# Patient Record
Sex: FEMALE | Race: WHITE | Employment: PART TIME | URBAN - METROPOLITAN AREA
[De-identification: names, ages, dates, MRNs, and addresses within clinical notes are randomized per-mention and may not be internally consistent; named-entity substitution may affect disease eponyms.]

---

## 2019-06-15 ENCOUNTER — APPOINTMENT (OUTPATIENT)
Dept: CT IMAGING | Facility: CLINIC | Age: 69
End: 2019-06-15
Attending: EMERGENCY MEDICINE
Payer: COMMERCIAL

## 2019-06-15 ENCOUNTER — HOSPITAL ENCOUNTER (EMERGENCY)
Facility: CLINIC | Age: 69
Discharge: LEFT BEFORE DISCHARGE | End: 2019-06-15
Attending: EMERGENCY MEDICINE | Admitting: EMERGENCY MEDICINE
Payer: COMMERCIAL

## 2019-06-15 VITALS
SYSTOLIC BLOOD PRESSURE: 139 MMHG | DIASTOLIC BLOOD PRESSURE: 77 MMHG | OXYGEN SATURATION: 97 % | WEIGHT: 215.39 LBS | TEMPERATURE: 98 F | RESPIRATION RATE: 13 BRPM | HEART RATE: 90 BPM

## 2019-06-15 DIAGNOSIS — R07.9 CHEST PAIN, UNSPECIFIED TYPE: ICD-10-CM

## 2019-06-15 LAB
ALBUMIN SERPL-MCNC: 3.8 G/DL (ref 3.4–5)
ALP SERPL-CCNC: 88 U/L (ref 40–150)
ALT SERPL W P-5'-P-CCNC: 27 U/L (ref 0–50)
ANION GAP SERPL CALCULATED.3IONS-SCNC: 7 MMOL/L (ref 3–14)
AST SERPL W P-5'-P-CCNC: 17 U/L (ref 0–45)
BASOPHILS # BLD AUTO: 0 10E9/L (ref 0–0.2)
BASOPHILS NFR BLD AUTO: 0.5 %
BILIRUB SERPL-MCNC: 0.5 MG/DL (ref 0.2–1.3)
BUN SERPL-MCNC: 17 MG/DL (ref 7–30)
CALCIUM SERPL-MCNC: 8.9 MG/DL (ref 8.5–10.1)
CHLORIDE SERPL-SCNC: 106 MMOL/L (ref 94–109)
CO2 SERPL-SCNC: 29 MMOL/L (ref 20–32)
CREAT SERPL-MCNC: 1.05 MG/DL (ref 0.52–1.04)
D DIMER PPP FEU-MCNC: 0.6 UG/ML FEU (ref 0–0.5)
DIFFERENTIAL METHOD BLD: NORMAL
EOSINOPHIL # BLD AUTO: 0.2 10E9/L (ref 0–0.7)
EOSINOPHIL NFR BLD AUTO: 3 %
ERYTHROCYTE [DISTWIDTH] IN BLOOD BY AUTOMATED COUNT: 13.9 % (ref 10–15)
GFR SERPL CREATININE-BSD FRML MDRD: 54 ML/MIN/{1.73_M2}
GLUCOSE SERPL-MCNC: 100 MG/DL (ref 70–99)
HCT VFR BLD AUTO: 41.8 % (ref 35–47)
HGB BLD-MCNC: 13.3 G/DL (ref 11.7–15.7)
IMM GRANULOCYTES # BLD: 0 10E9/L (ref 0–0.4)
IMM GRANULOCYTES NFR BLD: 0.4 %
LIPASE SERPL-CCNC: 259 U/L (ref 73–393)
LYMPHOCYTES # BLD AUTO: 2.5 10E9/L (ref 0.8–5.3)
LYMPHOCYTES NFR BLD AUTO: 31.5 %
MCH RBC QN AUTO: 29.1 PG (ref 26.5–33)
MCHC RBC AUTO-ENTMCNC: 31.8 G/DL (ref 31.5–36.5)
MCV RBC AUTO: 92 FL (ref 78–100)
MONOCYTES # BLD AUTO: 0.7 10E9/L (ref 0–1.3)
MONOCYTES NFR BLD AUTO: 8.4 %
NEUTROPHILS # BLD AUTO: 4.4 10E9/L (ref 1.6–8.3)
NEUTROPHILS NFR BLD AUTO: 56.2 %
NRBC # BLD AUTO: 0 10*3/UL
NRBC BLD AUTO-RTO: 0 /100
PLATELET # BLD AUTO: 310 10E9/L (ref 150–450)
POTASSIUM SERPL-SCNC: 4 MMOL/L (ref 3.4–5.3)
PROT SERPL-MCNC: 7.4 G/DL (ref 6.8–8.8)
RBC # BLD AUTO: 4.57 10E12/L (ref 3.8–5.2)
SODIUM SERPL-SCNC: 142 MMOL/L (ref 133–144)
TROPONIN I SERPL-MCNC: <0.015 UG/L (ref 0–0.04)
TROPONIN I SERPL-MCNC: <0.015 UG/L (ref 0–0.04)
WBC # BLD AUTO: 7.9 10E9/L (ref 4–11)

## 2019-06-15 PROCEDURE — 25000125 ZZHC RX 250: Performed by: EMERGENCY MEDICINE

## 2019-06-15 PROCEDURE — 85025 COMPLETE CBC W/AUTO DIFF WBC: CPT | Performed by: EMERGENCY MEDICINE

## 2019-06-15 PROCEDURE — 85379 FIBRIN DEGRADATION QUANT: CPT | Performed by: EMERGENCY MEDICINE

## 2019-06-15 PROCEDURE — 71260 CT THORAX DX C+: CPT

## 2019-06-15 PROCEDURE — 93005 ELECTROCARDIOGRAM TRACING: CPT

## 2019-06-15 PROCEDURE — 74177 CT ABD & PELVIS W/CONTRAST: CPT

## 2019-06-15 PROCEDURE — 84484 ASSAY OF TROPONIN QUANT: CPT | Mod: 91 | Performed by: EMERGENCY MEDICINE

## 2019-06-15 PROCEDURE — 80053 COMPREHEN METABOLIC PANEL: CPT | Performed by: EMERGENCY MEDICINE

## 2019-06-15 PROCEDURE — 25000132 ZZH RX MED GY IP 250 OP 250 PS 637: Mod: GY | Performed by: EMERGENCY MEDICINE

## 2019-06-15 PROCEDURE — 99285 EMERGENCY DEPT VISIT HI MDM: CPT | Mod: 25

## 2019-06-15 PROCEDURE — 25000128 H RX IP 250 OP 636: Performed by: EMERGENCY MEDICINE

## 2019-06-15 PROCEDURE — 83690 ASSAY OF LIPASE: CPT | Performed by: EMERGENCY MEDICINE

## 2019-06-15 RX ORDER — MORPHINE SULFATE 4 MG/ML
4 INJECTION, SOLUTION INTRAMUSCULAR; INTRAVENOUS ONCE
Status: DISCONTINUED | OUTPATIENT
Start: 2019-06-15 | End: 2019-06-15 | Stop reason: HOSPADM

## 2019-06-15 RX ORDER — IOPAMIDOL 755 MG/ML
500 INJECTION, SOLUTION INTRAVASCULAR ONCE
Status: COMPLETED | OUTPATIENT
Start: 2019-06-15 | End: 2019-06-15

## 2019-06-15 RX ORDER — ONDANSETRON 2 MG/ML
4 INJECTION INTRAMUSCULAR; INTRAVENOUS ONCE
Status: DISCONTINUED | OUTPATIENT
Start: 2019-06-15 | End: 2019-06-15 | Stop reason: HOSPADM

## 2019-06-15 RX ORDER — NITROGLYCERIN 0.4 MG/1
0.4 TABLET SUBLINGUAL EVERY 5 MIN PRN
Status: COMPLETED | OUTPATIENT
Start: 2019-06-15 | End: 2019-06-15

## 2019-06-15 RX ADMIN — NITROGLYCERIN 0.4 MG: 0.4 TABLET SUBLINGUAL at 12:31

## 2019-06-15 RX ADMIN — NITROGLYCERIN 0.4 MG: 0.4 TABLET SUBLINGUAL at 12:23

## 2019-06-15 RX ADMIN — SODIUM CHLORIDE 89 ML: 9 INJECTION, SOLUTION INTRAVENOUS at 14:23

## 2019-06-15 RX ADMIN — IOPAMIDOL 100 ML: 755 INJECTION, SOLUTION INTRAVENOUS at 14:22

## 2019-06-15 RX ADMIN — NITROGLYCERIN 0.4 MG: 0.4 TABLET SUBLINGUAL at 12:16

## 2019-06-15 RX ADMIN — LIDOCAINE HYDROCHLORIDE 30 ML: 20 SOLUTION ORAL; TOPICAL at 12:54

## 2019-06-15 ASSESSMENT — ENCOUNTER SYMPTOMS
ABDOMINAL PAIN: 1
NAUSEA: 1
SHORTNESS OF BREATH: 1

## 2019-06-15 NOTE — ED AVS SNAPSHOT
[facility] Emergency Department  [address]  [facility] [address]  Phone:  [phone]  Fax:  [phone]                                    Venita Balderrama   MRN: [id number]    Department:  [facility] Emergency Department   Date of Visit:  6/15/2019           After Visit Summary Signature Page    I have received my discharge instructions, and my questions have been answered. I have discussed any challenges I see with this plan with the nurse or doctor.    ..........................................................................................................................................  Patient/Patient Representative Signature      ..........................................................................................................................................  Patient Representative Print Name and Relationship to Patient    ..................................................               ................................................  Date                                   Time    ..........................................................................................................................................  Reviewed by Signature/Title    ...................................................              ..............................................  Date                                               Time          22EPIC Rev 08/18

## 2019-06-15 NOTE — ED PROVIDER NOTES
History     Chief Complaint:  No chief complaint on file.      HPI   Venita Balderrama is a 69 year old female who presents to the ED for evaluation of chest pain. She says that her pain started around 15-20 minutes ago, and she says that pain began at a 10/10 and now she reports it as a 7/10. Along with her chest pain, she says that she has shortness of breath that she describes as if someone is squeezing her on her mid section. She also notes that her abdomen hurts when there is pressure applied, and it is sore when she takes a deep breath. The patient also explains that she has not been feeling well for weeks and has been feeling nauseous. She denies ever having heart issues. Of note, she has been getting an upset stomach for about a week, and she says she used janice seltzer for relief. She lst had a dose of janice seltzer 45 minutes ago.    CARDIAC RISK FACTORS:  Sex:    Female  Tobacco:   No  Hypertension:   No  Hyperlipidemia:  No  Diabetes:   No  Family History:  NO    PE/DVT RISK FACTORS:  Sex:    Female  Hormones:   No  Tobacco:   No  Cancer:   No  Travel:   No  Surgery:   No  Other immobilization:  No  Personal history:  No  Family history:  No    Allergies:  Sudafed      Medications:    The patient is not currently taking any prescribed medications.       Past Medical History:    History reviewed.  No pertinent past medical history.    Past Surgical History:    History reviewed. No pertinent surgical history.    Family History:    History reviewed. No pertinent family history.     Social History:  Arrived to the ED with spouse        Review of Systems   Respiratory: Positive for shortness of breath.    Cardiovascular: Positive for chest pain.   Gastrointestinal: Positive for abdominal pain and nausea.   All other systems reviewed and are negative.        Physical Exam   First Vitals:  Patient Vitals for the past 24 hrs:   BP Temp Temp src Pulse Heart Rate Resp SpO2 Weight   06/15/19 1409 -- -- -- -- -- -- --  97.7 kg (215 lb 6.2 oz)   06/15/19 1345 125/81 -- -- 77 81 13 -- --   06/15/19 1330 124/80 -- -- 78 80 10 -- --   06/15/19 1315 141/78 -- -- 76 75 10 97 % --   06/15/19 1300 (!) 142/92 -- -- 73 74 11 97 % --   06/15/19 1245 124/87 -- -- 76 80 14 95 % --   06/15/19 1230 132/86 -- -- 94 97 -- 96 % --   06/15/19 1215 (!) 186/101 -- -- 95 71 23 100 % --   06/15/19 1207 (!) 184/109 98  F (36.7  C) -- -- -- -- -- --   06/15/19 1206 -- -- Oral 92 -- 22 99 % --         Physical Exam    Nursing note and vitals reviewed.  Constitutional: Cooperative.   HENT:   Mouth/Throat: Moist mucous membranes.   Eyes: EOMI, nonicteric sclera  Cardiovascular: Normal rate, regular rhythm, no murmurs, rubs, or gallops  Pulmonary/Chest: Effort normal and breath sounds normal. No respiratory distress. No wheezes. No rales.   Abdominal: Soft. Epigastric TTP, nondistended, no guarding or rigidity. BS present.   Musculoskeletal: Normal range of motion.   Neurological: Alert. Moves all extremities spontaneously.   Skin: Skin is warm and dry. No rash noted.   Psychiatric: Normal mood and affect.     Emergency Department Course     ECG (12:05:25):  Rate 93 bpm. HI interval 156. QRS duration 90. QT/QTc 368/457. P-R-T axes 52 8 4. Normal sinus rhythm. Nonspecific ST and T wave abnormality. Abnormal ECG. Interpreted at 1210 by Jacob Cee MD.      Imaging:  Radiographic findings were communicated with the patient and family who voiced understanding of the findings.  CT Chest (PE) Abdomen Pelvis w Contrast   IMPRESSION: 1. No evidence of for pulmonary embolism or thoracic aortic dissection. 2. Indeterminate 1.6 cm right thyroid nodule is partially included on this exam. Thyroid ultrasound may be helpful for further characterization. 3. Small hiatal hernia. 4. Multiple right renal cysts are noted.      Laboratory:  CBC:  o/w WNL. (WBC 7.9, HGB 13.3, )   CMP: (Creatinine 1.05 (H))    Lipase: 259  Troponin IL: <0.015     D dimer  quantitative: 0.6 (H)      Interventions:  1216 Nitrostat 0.4 mg sublingual  1223 Nitrostat 0.4 mg sublingual  1231 Nitrostat 0.4 mg sublingual   1423 NS 1L IV Bolus     Emergency Department Course:  Past medical records, nursing notes, and vitals reviewed.  1203: I performed an exam of the patient and obtained history, as documented above.    IV inserted and blood drawn.    The patient was sent for a Chest CT while in the emergency department, findings above.    1309: I rechecked the patient. Explained findings to patient and family.    1514: I rechecked the patient. Explained findings to patient and family.    Venita Balderrama has made the decision to leave the current treatment against medical advice.  She has been told that her symptoms may possibly be caused by a heart attack/unstable angina. She has been informed and understands the inherent risks, including death and permanent disability.  She has decided to accept the responsibility for her decision.  She has the capacity to make this informed decision.  She is alert and oriented x 3, understands these instructions, and is able to ambulate.  Venita Balderrama and all necessary parties have been advised that they may return at any time for further evaluation or treatment.        Impression & Plan      Medical Decision Making:  Venita Balderrama is a 69 year old female who presents with chest pain. The DDx of the patients chest pain includes ACS, angina, pericarditis, PE, pneumonia, pleuritis, dissection, aneurysmal disease, GERD, esophageal spasm, costochronditis/chest wall pain, etc as the most likely etiologies. Workup as above has been negative thus far. Troponin x2 is negative. A d-dimer was elevated prompting CT chest which was also negative for acute etiology. Multiple incidental findings were noted on CT including thyroid nodule, hiatal hernia, and renal cysts. I shared this with pt and  at bedside and made them aware of need for outpt follow-up. Pt had  pain and diaphoresis and pain did improve with nitroglycerin. She is moderate risk by HEART score which I discussed with her at length at bedside. I recommended hospital admission for this reason, but pt states that she and her  have a graduation party tonight they wish to go to. I offered to arrange admission closer to their home in [place], but she declines this as well, wishing to leave AMA. We had long discussion regarding risks of MI and sequela of this including disability and death. She has capacity to make this decision. Recommended primary care follow-up as soon as possible with return to closest emergency department for recurrent symptoms. She is in stable condition at time of AMA signout.         Diagnosis:    ICD-10-CM    1. Chest pain, unspecified type R07.9        Disposition:   KEITH Morgan Maria Isabel  6/15/2019   [facility] EMERGENCY DEPARTMENT  Scribe Disclosure:  Eddie ROLAND, am serving as a scribe at 12:03 PM on 6/15/2019 to document services personally performed by Jacob Cee MD based on my observations and the provider's statements to me.        Jacob Cee MD  06/18/19 2040

## 2019-06-15 NOTE — ED TRIAGE NOTES
Pt presents with a sudden onset of chest pain that wraps around her chest associated with SOB, sweating and emesis X1. Pt is A&O, ABC's intact.

## 2019-06-15 NOTE — DISCHARGE INSTRUCTIONS
You have chosen to leave against medical advice. We recommend that you be admitted to the hospital for further cardiac evaluation. Since you want to go home, please follow-up with your doctor early next week and go to the closest emergency department if your symptoms return.    We also discussed the incidental findings on your CT including thyroid nodules, hiatal hernia, and renal cysts. Please discuss these with your doctor when you follow-up as you may need more imaging.

## 2019-06-17 LAB — INTERPRETATION ECG - MUSE: NORMAL
